# Patient Record
Sex: MALE
[De-identification: names, ages, dates, MRNs, and addresses within clinical notes are randomized per-mention and may not be internally consistent; named-entity substitution may affect disease eponyms.]

---

## 2024-01-04 ENCOUNTER — DASHBOARD ENCOUNTERS (OUTPATIENT)
Age: 34
End: 2024-01-04

## 2024-01-04 ENCOUNTER — OFFICE VISIT (OUTPATIENT)
Dept: URBAN - METROPOLITAN AREA CLINIC 35 | Facility: CLINIC | Age: 34
End: 2024-01-04
Payer: COMMERCIAL

## 2024-01-04 VITALS
DIASTOLIC BLOOD PRESSURE: 72 MMHG | WEIGHT: 180 LBS | BODY MASS INDEX: 25.77 KG/M2 | HEART RATE: 74 BPM | SYSTOLIC BLOOD PRESSURE: 138 MMHG | OXYGEN SATURATION: 98 % | HEIGHT: 70 IN

## 2024-01-04 DIAGNOSIS — R10.84 GENERALIZED ABDOMINAL CRAMPING: ICD-10-CM

## 2024-01-04 DIAGNOSIS — K59.09 CHRONIC CONSTIPATION: ICD-10-CM

## 2024-01-04 DIAGNOSIS — R53.82 CHRONIC FATIGUE: ICD-10-CM

## 2024-01-04 DIAGNOSIS — R14.0 ABDOMINAL BLOATING: ICD-10-CM

## 2024-01-04 PROBLEM — 84229001: Status: ACTIVE | Noted: 2024-01-04

## 2024-01-04 PROCEDURE — 99204 OFFICE O/P NEW MOD 45 MIN: CPT | Performed by: PHYSICIAN ASSISTANT

## 2024-01-04 RX ORDER — HYOSCYAMINE SULFATE 0.12 MG/1
1 TABLET AS NEEDED TABLET ORAL
Qty: 30 | Refills: 1 | OUTPATIENT
Start: 2024-01-04 | End: 2024-03-04

## 2024-01-04 NOTE — HPI-ABDOMINAL PAIN
Pt admits to having digestive issues for a while.  He states years and years ago was treated for ADHD, and started having abdominal cramping years ago and had to stop medication for his disorder.  He saw a GI last year, and was discussing his abd cramping, had a colonoscopy with a benign polyp removed.  He was never given a diagnosis for his symptoms.  He admits to very infrequent diarrhea.  He will sometimes be constipated.  He will have a BM 3-4 times a day.  He will have to strain to have a BM sometimes.  He states he will get abdominal cramping and bloating before a BM, and then after a BM the pain will improve.  He states he has this cramping occasionally, but has generalized discomfort most of the time.  He denies heartburn.  He does admit to anxiety/depression, and is in therapy, but no medications.  No N/V, dysphagia.  He has never taken any medication for constipation.  He does endorse chronic fatigue.  He plans on having labs drawn today with PCP.

## 2024-01-11 ENCOUNTER — OFFICE VISIT (OUTPATIENT)
Dept: URBAN - METROPOLITAN AREA CLINIC 35 | Facility: CLINIC | Age: 34
End: 2024-01-11

## 2024-02-01 ENCOUNTER — OV EP (OUTPATIENT)
Dept: URBAN - METROPOLITAN AREA CLINIC 35 | Facility: CLINIC | Age: 34
End: 2024-02-01